# Patient Record
(demographics unavailable — no encounter records)

---

## 2024-12-02 NOTE — HISTORY OF PRESENT ILLNESS
[FreeTextEntry1] : Patient here for Depo She has been on Depo for approximately 20 years. SHe is again inquiring about the risks of prolonged depo I explained to her that prolonged use of depo increases the risk of loss of bone mineral density. I have previously recommended that she switch to a different form of birth control but none of the other methods were acceptable to her. I also recommended to perform a DEXA scan but she hasn't done it yet.

## 2024-12-02 NOTE — PLAN
[FreeTextEntry1] : Depo Administered. I again recommended that patient perform a DEXA and if there is osteoporosis or possibly osteopenia - to switch to a different for of birth control I also recommend mammography and a screening colonoscopy.

## 2024-12-06 NOTE — HISTORY OF PRESENT ILLNESS
[FreeTextEntry1] : 46 yo female with pmhx of mi, pci of rca/lad with consuelo is here for a f/up visit no active [facility] complains 3/22 admitted to [facility] with syncope 2d echo unrem non complaint with meds complaint with diet ros is otherwise as below

## 2024-12-06 NOTE — REASON FOR VISIT
[Symptom and Test Evaluation] : symptom and test evaluation [CV Risk Factors and Non-Cardiac Disease] : CV risk factors and non-cardiac disease [Arrhythmia/ECG Abnorrmalities] : arrhythmia/ECG abnormalities [Hyperlipidemia] : hyperlipidemia [Coronary Artery Disease] : coronary artery disease

## 2025-02-25 NOTE — HISTORY OF PRESENT ILLNESS
[FreeTextEntry1] : Patient here for Depo She has been on Depo for approximately 20 years. SHe is again inquiring about the risks of prolonged depo I explained to her that prolonged use of depo increases the risk of loss of bone mineral density. I have previously recommended that she switch to a different form of birth control. I am recommending an IUD.  Patient will think about it. I also recommended to perform a DEXA scan several times but she hasn't done it yet.

## 2025-06-06 NOTE — HISTORY OF PRESENT ILLNESS
[FreeTextEntry1] : 47 yo female with pmhx of mi, pci of rca/lad with consuelo is here for a f/up visit no active [facility] complains 3/22 admitted to [facility] with syncope 2d echo unrem non complaint with meds complaint with diet ros is otherwise as below

## 2025-06-06 NOTE — ASSESSMENT
[FreeTextEntry1] : 47 yo female with pmhx and presentation as above cad, multivessel pci cont meds repeat labs today cont with aggressive risk modif diet and exercise as tolerated stress echo low risk  minimize nsaids use in the view of prior card hx rtc 6 months.